# Patient Record
Sex: MALE | Race: WHITE | NOT HISPANIC OR LATINO | Employment: UNEMPLOYED | URBAN - METROPOLITAN AREA
[De-identification: names, ages, dates, MRNs, and addresses within clinical notes are randomized per-mention and may not be internally consistent; named-entity substitution may affect disease eponyms.]

---

## 2017-12-19 ENCOUNTER — HOSPITAL ENCOUNTER (EMERGENCY)
Facility: MEDICAL CENTER | Age: 22
End: 2017-12-19
Attending: EMERGENCY MEDICINE
Payer: COMMERCIAL

## 2017-12-19 VITALS
OXYGEN SATURATION: 98 % | HEIGHT: 73 IN | BODY MASS INDEX: 22.21 KG/M2 | RESPIRATION RATE: 18 BRPM | SYSTOLIC BLOOD PRESSURE: 134 MMHG | HEART RATE: 82 BPM | TEMPERATURE: 99.8 F | DIASTOLIC BLOOD PRESSURE: 84 MMHG | WEIGHT: 167.55 LBS

## 2017-12-19 DIAGNOSIS — S83.005A PATELLAR DISLOCATION, LEFT, INITIAL ENCOUNTER: ICD-10-CM

## 2017-12-19 PROCEDURE — 27560 TREAT KNEECAP DISLOCATION: CPT

## 2017-12-19 PROCEDURE — 99283 EMERGENCY DEPT VISIT LOW MDM: CPT

## 2017-12-19 PROCEDURE — 306637 HCHG MISC ORTHO ITEM RC 0274

## 2017-12-19 ASSESSMENT — PAIN SCALES - GENERAL: PAINLEVEL_OUTOF10: 5

## 2017-12-19 NOTE — ED NOTES
"Chief Complaint   Patient presents with   • Knee Injury     Left kneecap dislocation above prosthetic, pt was snowboarding.      /84   Pulse 82   Temp 37.7 °C (99.8 °F)   Resp 18   Ht 1.854 m (6' 1\")   Wt 76 kg (167 lb 8.8 oz)   SpO2 98%   BMI 22.11 kg/m²     "

## 2017-12-20 NOTE — ED PROVIDER NOTES
"ED Provider Note    CHIEF COMPLAINT  Chief Complaint   Patient presents with   • Knee Injury     Left kneecap dislocation above prosthetic, pt was snowboarding.        HPI  Melvin Nesbitt is a 22 y.o. 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 No weakness numbness or tingling, no other injuries. He has no history of this in the past.    REVIEW OF SYSTEMS  Negative for weakness, numbness, tingling, back pain.    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    SOCIAL HISTORY  Social History     Social History Main Topics   • Smoking status: Never Smoker   • Smokeless tobacco: Never Used   • Alcohol use No   • Drug use: No   • Sexual activity: Not on file       SURGICAL HISTORY  patient denies any surgical history    CURRENT MEDICATIONS  I personally reviewed the medication list in the charting documentation.     ALLERGIES  No Known Allergies    PHYSICAL EXAM  VITAL SIGNS: /84   Pulse 82   Temp 37.7 °C (99.8 °F)   Resp 18   Ht 1.854 m (6' 1\")   Wt 76 kg (167 lb 8.8 oz)   SpO2 98%   BMI 22.11 kg/m²   Constitutional: Alert in no apparent distress.  HENT: No signs of trauma.   Eyes: Conjunctiva normal, Non-icteric.   Chest: Normal nonlabored respirations  Skin: No erythema, No rash.   Musculoskeletal: Left BKA with lateral patellar deviation  Neurologic: Alert, No focal deficits noted.   Psychiatric: Affect normal, Judgment normal.    DIAGNOSTIC STUDIES / PROCEDURES    Joint Reduction Procedure Note    Indication: Patella dislocation    Consent: The patient provided verbal consent for this procedure.    Procedure: The patient was placed in the sitting position. Anesthesia/pain control not needed. Reduction of the left patella was performed by " extending the distal stump and applying medial pressure on to the deviated patella. Post reduction films are not needed. A post-reduction exam revealed distal perfusion & neurologic function to be normal.     The patient tolerated the procedure well.    Complications: None          COURSE & MEDICAL DECISION MAKING  Pertinent Labs & Imaging studies reviewed. (See chart for details)    Encounter Summary: This is a 22 y.o. male with a dislocated left patella complicated by a chronic BKA on that side, reduced at the bedside, no immobilization will be performed as he has a prosthetic that he uses to ambulate. Stable and appropriate for discharge      DISPOSITION: Discharge Home      FINAL IMPRESSION  1. Patellar dislocation, left, initial encounter        This dictation was created using voice recognition software. The accuracy of the dictation is limited to the abilities of the software. I expect there may be some errors of grammar and possibly content. The nursing notes were reviewed and certain aspects of this information were incorporated into this note.    Electronically signed by: Akil Shetty, 12/19/2017 4:23 PM

## 2017-12-20 NOTE — DISCHARGE INSTRUCTIONS
Patellar Dislocation  A patellar dislocation occurs when your kneecap (patella) slips out of its normal position in a groove in front of the lower end of your thighbone (femur). This groove is called the patellofemoral groove.   CAUSES  The kneecap is normally positioned over the front of the knee joint at the base of the thighbone. A kneecap can be dislocated when:  · The kneecap is out of place (patellar tracking disorder), and force is applied.  · The foot is firmly planted pointing outward, and the knee bends with the thigh turned inward. This kind of injury is common during many sports activities.  · The inner edge of the kneecap is hit, pushing it toward the outer side of the leg.  SIGNS AND SYMPTOMS  · Severe pain.  · A misshapen knee that looks like a bone is out of position.  · A popping sensation, followed by a feeling that something is out of place.  · Inability to bend or straighten the knee.  · Knee swelling.  · Cool, pale skin or numbness and tingling in or below the affected knee.  DIAGNOSIS   Your health care provider will physically examine the injured area. An X-ray exam may be done to make sure a bone fracture has not occurred. In some cases, your health care provider may look inside your knee joint with an instrument much like a pencil-sized telescope (arthroscope). This may be done to make sure you have no loose cartilage in your joint. Loose cartilage is not visible on an X-ray image.  TREATMENT   In many instances, the patella can be guided back into position without much difficulty. It often goes back into position by straightening the leg. Often, nothing more may be needed other than a brief period of immobilization followed by the exercises your health care provider recommends. If patellar dislocation starts to become frequent after the first incident, surgery may be needed to prevent your patella from slipping out of place.  HOME CARE INSTRUCTIONS   · Only take over-the-counter or  prescription medicines for pain, discomfort, or fever as directed by your health care provider.  · Use a knee brace if directed to do so by your health care provider.  · Use crutches as instructed.  · Apply ice to the injured knee:  ¨ Put ice in a plastic bag.  ¨ Place a towel between your skin and the bag.  ¨ Leave the ice on for 20 minutes, 2-3 times a day.  · Follow your health care provider's instructions for doing any recommended range-of-motion exercises or other exercises.  SEEK IMMEDIATE MEDICAL CARE IF:  · You have increased pain or swelling in the knee that is not relieved with medicine.  · You have increasing inflammation in the knee.  · You have locking or catching of your knee.  MAKE SURE YOU:  · Understand these instructions.  · Will watch your condition.  · Will get help right away if you are not doing well or get worse.     This information is not intended to replace advice given to you by your health care provider. Make sure you discuss any questions you have with your health care provider.     Document Released: 09/12/2002 Document Revised: 10/08/2014 Document Reviewed: 07/30/2014  [employer] Interactive Patient Education ©2016 [employer]